# Patient Record
Sex: FEMALE | Race: WHITE | NOT HISPANIC OR LATINO | URBAN - METROPOLITAN AREA
[De-identification: names, ages, dates, MRNs, and addresses within clinical notes are randomized per-mention and may not be internally consistent; named-entity substitution may affect disease eponyms.]

---

## 2021-10-25 ENCOUNTER — MEDICAL CORRESPONDENCE (OUTPATIENT)
Dept: HEALTH INFORMATION MANAGEMENT | Facility: CLINIC | Age: 31
End: 2021-10-25
Payer: COMMERCIAL

## 2021-11-01 ENCOUNTER — TELEPHONE (OUTPATIENT)
Dept: PEDIATRICS | Facility: CLINIC | Age: 31
End: 2021-11-01

## 2021-11-01 ENCOUNTER — TRANSCRIBE ORDERS (OUTPATIENT)
Dept: OTHER | Age: 31
End: 2021-11-01

## 2021-11-01 DIAGNOSIS — E78.6 ABETALIPOPROTEINEMIA: Primary | ICD-10-CM

## 2021-11-01 NOTE — TELEPHONE ENCOUNTER
LVM for patient to call back to schedule in person new patient Metabolism visit with Dr. Mar, Dr. Mcclain, Dr. Hilliard, or Dr. Garcia, with GC visit 30 minutes prior. Patient should sign CHA to have records sent from referring facility.